# Patient Record
Sex: FEMALE | Race: WHITE | NOT HISPANIC OR LATINO | Employment: FULL TIME | URBAN - NONMETROPOLITAN AREA
[De-identification: names, ages, dates, MRNs, and addresses within clinical notes are randomized per-mention and may not be internally consistent; named-entity substitution may affect disease eponyms.]

---

## 2023-05-02 ENCOUNTER — HOSPITAL ENCOUNTER (EMERGENCY)
Facility: HOSPITAL | Age: 23
Discharge: HOME | End: 2023-05-02
Attending: EMERGENCY MEDICINE
Payer: COMMERCIAL

## 2023-05-02 VITALS
TEMPERATURE: 98.8 F | SYSTOLIC BLOOD PRESSURE: 113 MMHG | HEART RATE: 85 BPM | RESPIRATION RATE: 18 BRPM | DIASTOLIC BLOOD PRESSURE: 85 MMHG | BODY MASS INDEX: 18 KG/M2 | WEIGHT: 112 LBS | HEIGHT: 66 IN | OXYGEN SATURATION: 99 %

## 2023-05-02 DIAGNOSIS — K08.89 PAIN, DENTAL: Primary | ICD-10-CM

## 2023-05-02 PROCEDURE — 25010000002 KETOROLAC TROMETHAMINE PER 15 MG

## 2023-05-02 PROCEDURE — 96372 THER/PROPH/DIAG INJ SC/IM: CPT

## 2023-05-02 PROCEDURE — 99283 EMERGENCY DEPT VISIT LOW MDM: CPT

## 2023-05-02 RX ORDER — LIDOCAINE HYDROCHLORIDE 20 MG/ML
10 SOLUTION OROPHARYNGEAL ONCE
Status: COMPLETED | OUTPATIENT
Start: 2023-05-02 | End: 2023-05-02

## 2023-05-02 RX ORDER — KETOROLAC TROMETHAMINE 30 MG/ML
15 INJECTION, SOLUTION INTRAMUSCULAR; INTRAVENOUS ONCE
Status: COMPLETED | OUTPATIENT
Start: 2023-05-02 | End: 2023-05-02

## 2023-05-02 RX ADMIN — KETOROLAC TROMETHAMINE 15 MG: 30 INJECTION, SOLUTION INTRAMUSCULAR; INTRAVENOUS at 11:09

## 2023-05-02 RX ADMIN — TOPICAL ANESTHETIC 1 SPRAY: 200 SPRAY DENTAL; PERIODONTAL at 11:07

## 2023-05-02 RX ADMIN — LIDOCAINE HYDROCHLORIDE 10 ML: 20 SOLUTION ORAL at 11:07

## 2023-05-02 NOTE — ED PROVIDER NOTES
"Subjective  History of Present Illness:    This is a 23-year-old female presents emergency room today after having her wisdom teeth removed on April 26, 2023.  She went back to her oral surgeon yesterday after having continued pain after wisdom teeth removal.  She was told that she had dry sockets.  Reports to ER today for further evaluation after continued pain after visiting oral surgeon yesterday.  Denies any fevers or chills.  Denies any difficulty swallowing.  Reports that she was discharged home with hydrocodone, however she has only been taking quarter pills of this.  She also reports that she has been taking Tylenol and ibuprofen without much relief of her pain.  She has gauze that is medicated in place of the dry socket on the left posterior aspect of the lower jaw.  Tolerating oral liquids without difficulty.      Nurses Notes reviewed and agree, including vitals, allergies, social history and prior medical history.     REVIEW OF SYSTEMS: All systems reviewed and not pertinent unless noted.  Review of Systems   Constitutional: Negative for chills and fever.   HENT: Positive for dental problem.    All other systems reviewed and are negative.      History reviewed. No pertinent past medical history.    Allergies:    Patient has no known allergies.      Past Surgical History:   Procedure Laterality Date   • WISDOM TOOTH EXTRACTION Bilateral          Social History     Socioeconomic History   • Marital status: Single   Tobacco Use   • Smoking status: Never   Substance and Sexual Activity   • Alcohol use: Never   • Drug use: Never         History reviewed. No pertinent family history.    Objective  Physical Exam:  /85 (BP Location: Left arm, Patient Position: Sitting)   Pulse 85   Temp 98.8 °F (37.1 °C) (Oral)   Resp 18   Ht 167.6 cm (66\")   Wt 50.8 kg (112 lb)   SpO2 99%   BMI 18.08 kg/m²      Physical Exam  Vitals and nursing note reviewed.   Constitutional:       General: She is not in acute " distress.     Appearance: Normal appearance. She is normal weight. She is not ill-appearing, toxic-appearing or diaphoretic.   HENT:      Head: Normocephalic and atraumatic.      Nose: Nose normal. No congestion or rhinorrhea.      Mouth/Throat:      Mouth: Mucous membranes are moist.      Pharynx: Oropharynx is clear. No oropharyngeal exudate or posterior oropharyngeal erythema.      Comments: Good dentition overall, there is a incision in the posterior left mouth that is stuffed with medicated gauze.  Uvula midline and nondeviated.  No evidence of tonsillar exudate or erythema or edema.  Eyes:      Extraocular Movements: Extraocular movements intact.   Cardiovascular:      Rate and Rhythm: Normal rate and regular rhythm.      Pulses: Normal pulses.      Heart sounds: Normal heart sounds.   Pulmonary:      Effort: Pulmonary effort is normal. No respiratory distress.      Breath sounds: Normal breath sounds. No stridor. No wheezing, rhonchi or rales.   Abdominal:      General: Abdomen is flat.   Musculoskeletal:         General: Normal range of motion.      Cervical back: Normal range of motion and neck supple. No rigidity or tenderness.   Lymphadenopathy:      Cervical: No cervical adenopathy.   Skin:     General: Skin is warm and dry.      Capillary Refill: Capillary refill takes less than 2 seconds.   Neurological:      General: No focal deficit present.      Mental Status: She is alert and oriented to person, place, and time.   Psychiatric:         Mood and Affect: Mood normal.         Behavior: Behavior normal.         Thought Content: Thought content normal.         Judgment: Judgment normal.             Procedures    ED Course:         Lab Results (last 24 hours)     ** No results found for the last 24 hours. **           No radiology results from the last 24 hrs       MDM    Initial impression of presenting illness: This is a 23-year-old female presents emergency room today after visiting the oral surgeon  yesterday and being diagnosed with dry sockets for further evaluation of continued dental pain.    DDX: includes but is not limited to: Dry sockets, dental pain, dental abscess, buccal or gumline abscess, dental avulsion, dental trauma, among other etiologies    Patient arrives hemodynamically stable, afebrile, nontachycardic, nonhypoxic and nontoxic-appearing.  With vitals interpreted by myself.     Pertinent features from physical exam: Good dentition overall, there is a incision in the posterior left mouth that is stuffed with medicated gauze.  Does not appear infected.  There are no obvious dental, periapical, buccal, or gumline abscesses noted.  Uvula midline and nondeviated.  No evidence of tonsillar exudate or erythema or edema..    Initial diagnostic plan: Do not believe any imaging or laboratory studies are necessary today.    Results from initial plan were reviewed and interpreted by me revealing NA    Diagnostic information from other sources: N/A    Interventions / Re-evaluation: Given tooth balls with HurriCaine spray and viscous lidocaine in addition to injection of Toradol.  She is stable for discharge home.    Results/clinical rationale were discussed with patient at bedside.  Recommended follow-up again with oral surgeon.  Recommended continue to follow-up with them anyways for removal of the medicated gauze as directed by her oral surgeon.  Other supportive care measures discussed with the patient.  Return precautions given.    Consultations/Discussion of results with other physicians: Discussed with attending physician prior to discharge.    Disposition plan: Discharge home.  Follow-up with oral surgeon.  Recommending continuing ibuprofen and Tylenol regimen in addition to the tooth balls every 8 hours as needed for dental pain.  Furthermore recommended continuing oral hydrocodone as already prescribed by her oral surgeon as directed that she has not really been taking, if needed only.  Return  precautions given.  She will return for any worsening symptoms.  -----    Final diagnoses:   Pain, dental        RossCj PA-C  05/02/23 1114

## 2023-05-02 NOTE — Clinical Note
[facility] EMERGENCY DEPARTMENT  [address] [address]  Phone: [phone]    Ary Willett was seen and treated in our emergency department on 5/2/2023.  She may return to work on 05/03/2023.         Thank you for choosing [facility].    Cj Bueno PA-C

## 2023-05-02 NOTE — DISCHARGE INSTRUCTIONS
Return to ER for any worsening symptoms.  Take the dental balls every 8 hours as needed for dental pain.  Recommend continuing ibuprofen and Motrin, you can also try Orajel.  Follow back up with your oral surgeon in the next few days for further recommendations.

## 2024-04-06 ENCOUNTER — HOSPITAL ENCOUNTER (EMERGENCY)
Facility: HOSPITAL | Age: 24
Discharge: HOME | End: 2024-04-06
Attending: STUDENT IN AN ORGANIZED HEALTH CARE EDUCATION/TRAINING PROGRAM
Payer: COMMERCIAL

## 2024-04-06 VITALS
BODY MASS INDEX: 21.5 KG/M2 | OXYGEN SATURATION: 100 % | WEIGHT: 133.8 LBS | DIASTOLIC BLOOD PRESSURE: 90 MMHG | HEART RATE: 85 BPM | TEMPERATURE: 97.9 F | HEIGHT: 66 IN | SYSTOLIC BLOOD PRESSURE: 126 MMHG | RESPIRATION RATE: 18 BRPM

## 2024-04-06 DIAGNOSIS — S61.216A LACERATION OF RIGHT LITTLE FINGER WITHOUT FOREIGN BODY WITHOUT DAMAGE TO NAIL, INITIAL ENCOUNTER: Primary | ICD-10-CM

## 2024-04-06 DIAGNOSIS — S61.209A EXTENSOR TENDON LACERATION OF FINGER WITH OPEN WOUND, INITIAL ENCOUNTER: ICD-10-CM

## 2024-04-06 DIAGNOSIS — S56.429A EXTENSOR TENDON LACERATION OF FINGER WITH OPEN WOUND, INITIAL ENCOUNTER: ICD-10-CM

## 2024-04-06 PROCEDURE — 99282 EMERGENCY DEPT VISIT SF MDM: CPT

## 2024-04-06 RX ORDER — BACITRACIN ZINC 500 [USP'U]/G
1 OINTMENT TOPICAL ONCE
Status: COMPLETED | OUTPATIENT
Start: 2024-04-06 | End: 2024-04-06

## 2024-04-06 RX ADMIN — BACITRACIN ZINC 1 APPLICATION: 500 OINTMENT TOPICAL at 04:55

## 2024-04-06 NOTE — DISCHARGE INSTRUCTIONS
Keep wound clean with soap and water daily.  Continue to use over-the-counter topical antibiotic such as bacitracin or Neosporin daily.  Follow-up with hand specialist as an outpatient, contact number provided to arrange appointment.

## 2024-04-06 NOTE — ED PROVIDER NOTES
EMERGENCY DEPARTMENT ENCOUNTER    Pt Name: Ary Willett  MRN: [id number]  Pt :   2000  Room Number:    Date of encounter:  2024  PCP: Provider, No Known  ED Provider: Catalino Mcconnell MD    Historian: Patient      HPI:  Chief Complaint: Laceration        Context: Ary Willett is a 24 y.o. female who presents to the ED c/o laceration.  Patient states she was washing dishes and accidentally broke a glass.  When she reached towards the glass she cut the back of her right pinky finger.  States that the tip of her finger now feels numb along with she feels like she cannot straighten the tip of that finger.  Denies any issues with flexion or extension of the other joints of the finger.      PAST MEDICAL HISTORY  No past medical history on file.      PAST SURGICAL HISTORY  Past Surgical History:   Procedure Laterality Date    WISDOM TOOTH EXTRACTION Bilateral          FAMILY HISTORY  No family history on file.      SOCIAL HISTORY  Social History     Socioeconomic History    Marital status: Single   Tobacco Use    Smoking status: Never   Substance and Sexual Activity    Alcohol use: Never    Drug use: Never         ALLERGIES  Patient has no known allergies.        REVIEW OF SYSTEMS  Review of Systems     All systems reviewed and negative except for those discussed in HPI.       PHYSICAL EXAM    I have reviewed the triage vital signs and nursing notes.    ED Triage Vitals [24 0427]   Temp Heart Rate Resp BP SpO2   97.9 °F (36.6 °C) 85 18 126/90 100 %      Temp src Heart Rate Source Patient Position BP Location FiO2 (%)   Oral Monitor Sitting Right arm --       Physical Exam    General:  Awake, alert, no acute distress  HEENT: Atraumatic, normocephalic, EOMI, PERRLA, mucous membranes moist  NECK:  Supple, atraumatic  Cardiovascular:  Regular rate.  all extremities well perfused  Respiratory:  Regular rate, equal chest rise. No resp distress  Abdominal:  Soft, nondistended   Extremity:  No  visible bony abnormalities in all 4 extremities.  Laceration along dorsal surface of DIP joint of fifth finger of right hand.  Fifth finger intact flexion and extension of PIP joint.  Fifth finger intact flexion of DIP joint, but limited extension.  Neuro:  AAOx3, GCS 15.  moving all extremities.  Decreased sensation along tip of right fifth finger.          LAB RESULTS  No results found for this or any previous visit (from the past 24 hour(s)).          RADIOLOGY  No Radiology Exams Resulted Within Past 24 Hours        PROCEDURES    Laceration Repair    Date/Time: 4/6/2024 5:12 AM    Performed by: Catalino Mcconnell MD  Authorized by: Catalino Mcconnell MD    Consent:     Consent obtained:  Verbal    Consent given by:  Patient    Risks discussed:  Infection, tendon damage, nerve damage and need for additional repair  Universal protocol:     Patient identity confirmed:  Verbally with patient and arm band  Anesthesia:     Anesthesia method:  Local infiltration    Local anesthetic:  Lidocaine 1% w/o epi  Laceration details:     Location:  Finger    Finger location:  R small finger    Length (cm):  2  Exploration:     Wound extent: tendon damage      Tendon damage location:  Upper extremity    Upper extremity tendon damage location:  Finger extensor    Finger extensor tendon:  Extensor digiti minimi    Tendon repair plan:  Refer for evaluation    Contaminated: no    Treatment:     Area cleansed with:  Chlorhexidine    Amount of cleaning:  Standard    Irrigation solution:  Sterile saline    Irrigation method:  Syringe  Skin repair:     Repair method:  Sutures    Suture size:  5-0    Suture material:  Nylon    Suture technique:  Simple interrupted    Number of sutures:  3  Approximation:     Approximation:  Close  Repair type:     Repair type:  Simple  Post-procedure details:     Dressing:  Antibiotic ointment, non-adherent dressing and splint for protection    Procedure completion:  Tolerated well, no immediate  complications      No orders to display       MEDICATIONS GIVEN IN ER    Medications   bacitracin ointment 1 Application (1 Application Topical Given 4/6/24 4881)         MEDICAL DECISION MAKING, PROGRESS, and CONSULTS    All labs, if obtained, have been independently reviewed by me.  All radiology studies, if obtained, have been reviewed by me and the radiologist dictating the report.  All EKG's, if obtained, have been independently viewed and interpreted by me.      Discussion below represents my analysis of pertinent findings related to patient's condition, differential diagnosis, treatment plan and final disposition.    Ary Willett is a 24 y.o. female who presents to the ED c/o finger laceration.  Patient has evidence of dorsal laceration on fifth finger of right hand.  Concern for possible extensor tendon involvement of the DIP joint based on exam.  Thoroughly cleaned and irrigated wound and repaired with sutures.  Splinted in extension.  Provided with referral for hand specialist.  Advised on return precautions and continued wound care as an outpatient.  Patient agreeable with this plan and was discharged.                                 Orders placed during this visit:  Orders Placed This Encounter   Procedures    Laceration Repair         ED Course:    Consultants:                  Shared Decision Making:  After my consideration of clinical presentation and any laboratory/radiology studies obtained, I discussed the findings with the patient/patient representative who is in agreement with the treatment plan and the final disposition.   Risks and benefits of discharge and/or observation/admission were discussed.      AS OF 05:13 EDT VITALS:    BP - 126/90  HR - 85  TEMP - 97.9 °F (36.6 °C) (Oral)  O2 SATS - 100%                  DIAGNOSIS  Final diagnoses:   Laceration of right little finger without foreign body without damage to nail, initial encounter   Extensor tendon laceration of finger with open  wound, initial encounter         DISPOSITION  Discharge      Please note that portions of this document were completed with voice recognition software.        Catalino Mcconnell MD  04/06/24 0523